# Patient Record
Sex: MALE | Race: WHITE | NOT HISPANIC OR LATINO | Employment: FULL TIME | URBAN - METROPOLITAN AREA
[De-identification: names, ages, dates, MRNs, and addresses within clinical notes are randomized per-mention and may not be internally consistent; named-entity substitution may affect disease eponyms.]

---

## 2021-04-29 ENCOUNTER — APPOINTMENT (OUTPATIENT)
Dept: RADIOLOGY | Facility: CLINIC | Age: 51
End: 2021-04-29
Payer: COMMERCIAL

## 2021-04-29 ENCOUNTER — OFFICE VISIT (OUTPATIENT)
Dept: URGENT CARE | Facility: CLINIC | Age: 51
End: 2021-04-29
Payer: COMMERCIAL

## 2021-04-29 VITALS
HEIGHT: 71 IN | HEART RATE: 82 BPM | RESPIRATION RATE: 22 BRPM | OXYGEN SATURATION: 92 % | BODY MASS INDEX: 42 KG/M2 | DIASTOLIC BLOOD PRESSURE: 90 MMHG | TEMPERATURE: 98 F | WEIGHT: 300 LBS | SYSTOLIC BLOOD PRESSURE: 160 MMHG

## 2021-04-29 DIAGNOSIS — S66.911A MUSCLE STRAIN OF RIGHT WRIST, INITIAL ENCOUNTER: Primary | ICD-10-CM

## 2021-04-29 DIAGNOSIS — S69.91XA INJURY OF RIGHT WRIST, INITIAL ENCOUNTER: ICD-10-CM

## 2021-04-29 PROCEDURE — 73110 X-RAY EXAM OF WRIST: CPT

## 2021-04-29 PROCEDURE — G0382 LEV 3 HOSP TYPE B ED VISIT: HCPCS | Performed by: PHYSICIAN ASSISTANT

## 2021-04-29 NOTE — PROGRESS NOTES
[facility]        NAME: Rosana Joseph is a 44 y o  male  : 1970    MRN: [id number]  DATE: 2021  TIME: 7:04 PM    Assessment and Plan   Muscle strain of right wrist, initial encounter [M71 [id number]]  1  Muscle strain of right wrist, initial encounter  CANCELED: XR wrist 3+ vw left     Right wrist XR: Negative for acute osseous abnormality  Pending Radiologist Interpretation  Patient Instructions       Follow up with PCP in 3-5 days  Proceed to  ER if symptoms worsen  Chief Complaint     Chief Complaint   Patient presents with    Wrist Injury     Pain in R wrist  Patient states that he believes it is from loading his wood stove  History of Present Illness       Patient is c/o right wrist pain  Pain began about 1 month ago  Pt is uncertain of specific injury  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  Does not hurt to touch but with movements  Pt has been using an OTC wrist brace which does help w/ pain and enables pt to perform work  No numbness or tingling  Wrist Pain   The pain is present in the right wrist  This is a new problem  The current episode started more than 1 month ago  There has been no history of extremity trauma  The problem occurs intermittently  The problem has been waxing and waning  The pain is mild  Pertinent negatives include no fever  Review of Systems   Review of Systems   Constitutional: Negative for chills and fever  HENT: Negative for ear pain and sore throat  Eyes: Negative for pain and visual disturbance  Respiratory: Negative for cough and shortness of breath  Cardiovascular: Negative for chest pain and palpitations  Gastrointestinal: Negative for abdominal pain and vomiting  Genitourinary: Negative for dysuria and hematuria  Musculoskeletal: Positive for arthralgias (right wrist pain)  Negative for back pain  Skin: Negative for color change and rash     Neurological: Negative for seizures and syncope  All other systems reviewed and are negative  Current Medications     No current outpatient medications on file  Current Allergies     Allergies as of 04/29/2021    (No Known Allergies)            The following portions of the patient's history were reviewed and updated as appropriate: allergies, current medications, past family history, past medical history, past social history, past surgical history and problem list      History reviewed  No pertinent past medical history  History reviewed  No pertinent surgical history  History reviewed  No pertinent family history  Medications have been verified  Objective   /90   Pulse 82   Temp 98 °F (36 7 °C)   Resp 22   Ht 5' 10 5" (1 791 m)   Wt 136 kg (300 lb)   SpO2 92%   BMI 42 44 kg/m²   No LMP for male patient  Physical Exam     Physical Exam  Constitutional:       Appearance: Normal appearance  He is normal weight  HENT:      Head: Normocephalic and atraumatic  Nose: Nose normal       Mouth/Throat:      Mouth: Mucous membranes are dry  Eyes:      Extraocular Movements: Extraocular movements intact  Conjunctiva/sclera: Conjunctivae normal       Pupils: Pupils are equal, round, and reactive to light  Neck:      Musculoskeletal: Normal range of motion and neck supple  Cardiovascular:      Rate and Rhythm: Normal rate  Pulmonary:      Effort: Pulmonary effort is normal    Musculoskeletal: Normal range of motion  Arms:    Skin:     General: Skin is warm and dry  Neurological:      General: No focal deficit present  Mental Status: He is alert and oriented to person, place, and time     Psychiatric:         Mood and Affect: Mood normal          Behavior: Behavior normal

## 2021-04-29 NOTE — PATIENT INSTRUCTIONS
Orthopedics follow up if symptoms do not improve  Take Motrin or Ibuprofen as instructed to reduce pain/swelling  Wear brace as instructed  ED if symptoms worsen  Wrist Sprain   WHAT YOU NEED TO KNOW:   A wrist sprain happens when one or more ligaments in your wrist stretch or tear  Ligaments are tough tissues that connect bones and keep them in place, and support your joints  DISCHARGE INSTRUCTIONS:   Return to the emergency department if:   · You have severe pain or swelling  · Your injured wrist is red or has red streaks spreading from the injured area  · You have new trouble moving your hands, fingers, or wrist     · Your wrist, hand, or fingers feel cold or numb  · Your fingernails turn blue or gray  Call your doctor if:   · Your symptoms get worse  · You have pain and swelling for more than 48 hours  · You have questions or concerns about your condition or care  Medicines: You may need any of the following:  · NSAIDs , such as ibuprofen, help decrease swelling, pain, and fever  NSAIDs can cause stomach bleeding or kidney problems in certain people  If you take blood thinner medicine, always ask your healthcare provider if NSAIDs are safe for you  Always read the medicine label and follow directions  · Acetaminophen  decreases pain and fever  It is available without a doctor's order  Ask how much to take and how often to take it  Follow directions  Read the labels of all other medicines you are using to see if they also contain acetaminophen, or ask your doctor or pharmacist  Acetaminophen can cause liver damage if not taken correctly  Do not use more than 4 grams (4,000 milligrams) total of acetaminophen in one day  · Take your medicine as directed  Contact your healthcare provider if you think your medicine is not helping or if you have side effects  Tell him or her if you are allergic to any medicine  Keep a list of the medicines, vitamins, and herbs you take   Include the amounts, and when and why you take them  Bring the list or the pill bottles to follow-up visits  Carry your medicine list with you in case of an emergency  Self-care:   · Rest  your wrist for at least 48 hours  Avoid activities that cause pain  · Ice  your wrist for 15 to 20 minutes every hour or as directed  Use an ice pack, or put crushed ice in a plastic bag  Cover it with a towel before you put it on your wrist  Ice helps prevent tissue damage and decreases swelling and pain  · Compress  your wrist with an elastic bandage  This will help decrease swelling, support your wrist, and help it heal  Wear your wrist wrap as directed  The elastic bandage should be snug but not tight  · Elevate  your wrist above the level of your heart as often as you can  This will help decrease swelling and pain  Prop your wrist on pillows or blankets to keep it elevated comfortably  Wrist support: You may need to wear a splint or cast to support your wrist and prevent more damage  Wear your splint as directed  Ask for instructions on how to bathe while you are wearing a splint or cast   Physical therapy:  Your healthcare provider may recommend that you go to physical therapy  A physical therapist teaches you exercises to help improve movement and strength, and to decrease pain  Follow up with your doctor as directed:  Write down your questions so you remember to ask them during your visits  © Copyright [address] Information is for End User's use only and may not be sold, redistributed or otherwise used for commercial purposes  All illustrations and images included in CareNotes® are the copyrighted property of [employer]  or [facility] [employer]   The above information is an  only  It is not intended as medical advice for individual conditions or treatments  Talk to your doctor, nurse or pharmacist before following any medical regimen to see if it is safe and effective for you